# Patient Record
Sex: FEMALE | Race: WHITE | NOT HISPANIC OR LATINO | Employment: FULL TIME | URBAN - METROPOLITAN AREA
[De-identification: names, ages, dates, MRNs, and addresses within clinical notes are randomized per-mention and may not be internally consistent; named-entity substitution may affect disease eponyms.]

---

## 2023-07-21 ENCOUNTER — APPOINTMENT (OUTPATIENT)
Dept: CT IMAGING | Facility: HOSPITAL | Age: 53
End: 2023-07-21
Payer: COMMERCIAL

## 2023-07-21 ENCOUNTER — HOSPITAL ENCOUNTER (EMERGENCY)
Facility: HOSPITAL | Age: 53
Discharge: HOME | End: 2023-07-21
Attending: EMERGENCY MEDICINE | Admitting: EMERGENCY MEDICINE
Payer: COMMERCIAL

## 2023-07-21 VITALS
WEIGHT: 250.44 LBS | HEIGHT: 64 IN | BODY MASS INDEX: 42.76 KG/M2 | OXYGEN SATURATION: 94 % | HEART RATE: 73 BPM | DIASTOLIC BLOOD PRESSURE: 86 MMHG | SYSTOLIC BLOOD PRESSURE: 163 MMHG | TEMPERATURE: 98.2 F | RESPIRATION RATE: 16 BRPM

## 2023-07-21 DIAGNOSIS — G43.B0 OPHTHALMOPLEGIC MIGRAINE, NOT INTRACTABLE: Primary | ICD-10-CM

## 2023-07-21 LAB
HOLD SPECIMEN: NORMAL
WHOLE BLOOD HOLD SPECIMEN: NORMAL

## 2023-07-21 PROCEDURE — 70450 CT HEAD/BRAIN W/O DYE: CPT

## 2023-07-21 PROCEDURE — 96375 TX/PRO/DX INJ NEW DRUG ADDON: CPT

## 2023-07-21 PROCEDURE — 99283 EMERGENCY DEPT VISIT LOW MDM: CPT

## 2023-07-21 PROCEDURE — 25010000002 PROCHLORPERAZINE 10 MG/2ML SOLUTION: Performed by: EMERGENCY MEDICINE

## 2023-07-21 PROCEDURE — 96374 THER/PROPH/DIAG INJ IV PUSH: CPT

## 2023-07-21 PROCEDURE — 25010000002 DIPHENHYDRAMINE PER 50 MG: Performed by: EMERGENCY MEDICINE

## 2023-07-21 RX ORDER — DIPHENHYDRAMINE HYDROCHLORIDE 50 MG/ML
25 INJECTION INTRAMUSCULAR; INTRAVENOUS ONCE
Status: COMPLETED | OUTPATIENT
Start: 2023-07-21 | End: 2023-07-21

## 2023-07-21 RX ORDER — PROCHLORPERAZINE MALEATE 10 MG
10 TABLET ORAL EVERY 6 HOURS PRN
Qty: 12 TABLET | Refills: 0 | Status: SHIPPED | OUTPATIENT
Start: 2023-07-21

## 2023-07-21 RX ORDER — PROCHLORPERAZINE EDISYLATE 5 MG/ML
5 INJECTION INTRAMUSCULAR; INTRAVENOUS ONCE
Status: COMPLETED | OUTPATIENT
Start: 2023-07-21 | End: 2023-07-21

## 2023-07-21 RX ADMIN — DIPHENHYDRAMINE HYDROCHLORIDE 25 MG: 50 INJECTION, SOLUTION INTRAMUSCULAR; INTRAVENOUS at 18:35

## 2023-07-21 RX ADMIN — PROCHLORPERAZINE EDISYLATE 5 MG: 5 INJECTION INTRAMUSCULAR; INTRAVENOUS at 18:35

## 2023-07-21 NOTE — ED NOTES
Pt reports while at work earlier today around noon she had kaleidescope and blurry vision to her rt eye, this has resolved since. Pt reports now with HA and blurry vision to both eyes and pressure behind both eyes.

## 2023-07-21 NOTE — ED PROVIDER NOTES
Subjective   History of Present Illness  53-year-old female complaining of visual changes in her right.  She states she had flashing scotomata and sort of a kaleidoscope appearance.  She reports that there is no visual field dropout or amaurosis fugax.  She reports that she has had a headache afterwards.  The patient states that she has had no recent head injuries she denies neck pain or stiffness reports no fever or chills reports no recent change in visual acuity and has no history of elevated intraocular pressures.  She reports that she has had no syncope.  She states her last seizure was in 2014    Review of Systems   Constitutional:  Positive for fatigue. Negative for chills and fever.   Eyes:  Positive for visual disturbance. Negative for pain.   Respiratory:  Negative for chest tightness and shortness of breath.    Cardiovascular:  Negative for palpitations.   Neurological:  Positive for seizures and headaches. Negative for tremors, syncope, weakness and light-headedness.     Past Medical History:   Diagnosis Date    Abnormal mammogram of right breast     Allergic rhinitis     Anxiety     Back pain     LUMBAR AND THORACIC REGION    Chest pain     Dermatitis     Dyslipidemia     Elevated blood pressure reading     Fibrocystic breast disease     Foot pain, left     Glucose intolerance     Headache, tension-type     High risk heterosexual behavior     Hypertension     Obesity     Pain in joint, shoulder region     Perimenopausal     Right tennis elbow     Seizure disorder     Tinnitus     Vertigo      Patient states she is perimenopausal  No Known Allergies    Past Surgical History:   Procedure Laterality Date    COLONOSCOPY W/ BIOPSIES  01/15/2021    1 polyp removed, path tubular adenoma - repeat will be 7 (adenomatous) years    LAPAROSCOPIC OVARIAN CYSTECTOMY  12/17/2008    L PARA OVARIAN CYSTECTOMY        Family History   Problem Relation Age of Onset    Kidney disease Mother     Hypertension Mother     Heart  disease Father     Hypertension Father     Heart disease Brother     Kidney disease Brother     Heart disease Paternal Grandfather     Other Other         FAMILY HISTORY OF RENAL STONES- STRONGLY POSITIVE        Social History     Socioeconomic History    Marital status:    Tobacco Use    Smoking status: Never    Smokeless tobacco: Never   Vaping Use    Vaping Use: Never used   Substance and Sexual Activity    Alcohol use: Not Currently    Drug use: Never    Sexual activity: Defer       Reports no unusual food water travel or activity    Objective   Physical Exam  Alert Partha Coma Scale 15   HEENT: Pupils equal and reactive to light. Conjunctivae are not injected. Normal tympanic membranes. Oropharynx and nares are normal.  Normal IOP, no papilledema   Neck: Supple. Midline trachea. No JVD. No goiter.   Chest: Clear and equal breath sounds bilaterally, regular rate and rhythm without murmur or rub.   Abdomen: Positive bowel sounds, nontender, nondistended. No rebound or peritoneal signs. No CVA tenderness.   Extremities/neuro patient is right-handed.  The patient's cranial nerves are intact and symmetrical visual fields are intact to confrontational testing no clubbing. cyanosis or edema. Motor sensory exam is normal. The full range of motion is intact no abnormal reflexes elicited normal gait   Skin: Warm and dry, no rashes or petechia.   Lymphatic: No regional lymphadenopathy. No calf pain, swelling or Homans sign    Procedures           ED Course           Labs Reviewed   RAINBOW DRAW    Narrative:     The following orders were created for panel order [place] Draw.  Procedure                               Abnormality         Status                     ---------                               -----------         ------                     Green Top (Gel)[[id number]]                                  Final result               Lavender Top[[id number]]                                     Final result                  Please view results for these tests on the individual orders.   GREEN TOP   LAVENDER TOP     Medications   diphenhydrAMINE (BENADRYL) injection 25 mg (25 mg Intravenous Given 7/21/23 1835)   prochlorperazine (COMPAZINE) injection 5 mg (5 mg Intravenous Given 7/21/23 1835)     CT Head Without Contrast    Result Date: 7/21/2023  Impression: 1. No acute intracranial abnormality. Specifically, no evidence of acute hemorrhage, mass effect or midline shift. 2. Symmetric appearance of the orbits. Electronically Signed: Prasad Newman  7/21/2023 7:34 PM EDT  Workstation ID: [id number]                                   Medical Decision Making  The patient's symptomatology resolved after administration of Benadryl and prochlorperazine.  The patient be discharged a prescription for oral prochlorperazine.  Triptans were avoided secondary to concerns regarding potential blood pressure elevation.  The patient was encouraged to follow-up with neurologist.  The patient was stable at discharge and vocalized understanding of discharge instructions and warning    Amount and/or Complexity of Data Reviewed  Radiology: ordered.    Risk  Prescription drug management.        Final diagnoses:   Ophthalmoplegic migraine, not intractable       ED Disposition  ED Disposition       ED Disposition   Discharge    Condition   Stable    Comment   --               No follow-up provider specified.       Medication List      No changes were made to your prescriptions during this visit.            Ricardo Tuttle MD  07/21/23 2027

## 2023-07-22 NOTE — DISCHARGE INSTRUCTIONS
Rest next 24 hours, avoid bright sunlight and heat exposure  You can also use Tylenol or ibuprofen for discomfort  Medication as directed  Follow-up with primary care provider and neurologist

## 2023-10-18 ENCOUNTER — TELEPHONE (OUTPATIENT)
Dept: FAMILY MEDICINE CLINIC | Facility: CLINIC | Age: 53
End: 2023-10-18

## 2023-10-18 ENCOUNTER — OFFICE VISIT (OUTPATIENT)
Dept: FAMILY MEDICINE CLINIC | Facility: CLINIC | Age: 53
End: 2023-10-18
Payer: COMMERCIAL

## 2023-10-18 VITALS
SYSTOLIC BLOOD PRESSURE: 144 MMHG | BODY MASS INDEX: 43.71 KG/M2 | TEMPERATURE: 98.2 F | HEIGHT: 64 IN | DIASTOLIC BLOOD PRESSURE: 85 MMHG | OXYGEN SATURATION: 95 % | HEART RATE: 79 BPM | WEIGHT: 256 LBS

## 2023-10-18 DIAGNOSIS — R63.5 WEIGHT GAIN: ICD-10-CM

## 2023-10-18 DIAGNOSIS — G40.909 SEIZURE DISORDER: Primary | ICD-10-CM

## 2023-10-18 DIAGNOSIS — H53.461: ICD-10-CM

## 2023-10-18 PROCEDURE — 99214 OFFICE O/P EST MOD 30 MIN: CPT | Performed by: FAMILY MEDICINE

## 2023-10-18 RX ORDER — LEVETIRACETAM 500 MG/1
500 TABLET ORAL DAILY
Qty: 90 TABLET | Refills: 3 | Status: SHIPPED | OUTPATIENT
Start: 2023-10-18

## 2023-10-18 NOTE — PROGRESS NOTES
Subjective   Tamiko Mcnally is a 53 y.o. female.   Chief Complaint   Patient presents with    Cough    Hypothyroidism       History of Present Illness   Presents to the office today for annual follow-up.  I saw her last about a year ago.    She is overall in good health.  A little concerned today as she has gained around 50 pounds over the last year.    She has a seizure disorder and she followed with neurology regarding this for a long time.  She has not had any seizures for over 12 years.  Prescription for her Keppra was turned over to primary care years ago.  I am filling that for her now.  She has been on 1 pill a day for several years.  When she was on 2 a day it did cause a little decrease in mental clarity and some confusion.  She feels fine on 1/day.    Had labs thru GYN -recently.  I do not have those results.  Tells me her triglycerides were up.        HLD-Labs through GYN as above.    Preventive care:    Last colonoscopy was January 2021-she had a polyp removed and was recommended 7-year repeat.  Pap smears and mammograms are done by Dr. Miller in her office.  She just had that done last month.          Patient Active Problem List    Diagnosis Date Noted    Congestion of nasal sinus 12/21/2021    Adenomatous polyp of ascending colon 06/21/2021     Note Last Updated: 6/21/2021     On colo - 1/15/21      Anxiety 08/26/2016    Fibrocystic breast disease 09/14/2012     Note Last Updated: 10/10/2022     Benign - seen on mammograms.  Started in her 20's      Dyslipidemia 06/14/2012    Glucose intolerance 06/14/2012    Seizure disorder 06/14/2012     Note Last Updated: 2/21/2020     Last seizure 2011- typically abseance - had a full seizure once.  Dr. Whitehead recommended lifetime treatment.              Past Surgical History:   Procedure Laterality Date    COLONOSCOPY W/ BIOPSIES  01/15/2021    1 polyp removed, path tubular adenoma - repeat will be 7 (adenomatous) years    LAPAROSCOPIC OVARIAN CYSTECTOMY   "12/17/2008    L PARA OVARIAN CYSTECTOMY      Current Outpatient Medications on File Prior to Visit   Medication Sig    [DISCONTINUED] levETIRAcetam (KEPPRA) 500 MG tablet Take 1 tablet by mouth Daily.    [DISCONTINUED] metroNIDAZOLE (Metrogel) 1 % gel Apply  topically to the appropriate area as directed Daily. (Patient not taking: Reported on 10/18/2023)    [DISCONTINUED] prochlorperazine (COMPAZINE) 10 MG tablet Take 1 tablet by mouth Every 6 (Six) Hours As Needed for Vomiting or Nausea. (Patient not taking: Reported on 10/18/2023)     No current facility-administered medications on file prior to visit.     No Known Allergies  Social History     Socioeconomic History    Marital status:    Tobacco Use    Smoking status: Never    Smokeless tobacco: Never   Vaping Use    Vaping Use: Never used   Substance and Sexual Activity    Alcohol use: Not Currently    Drug use: Never    Sexual activity: Defer     Family History   Problem Relation Age of Onset    Kidney disease Mother     Hypertension Mother     Heart disease Father     Hypertension Father     Heart disease Brother     Kidney disease Brother     Heart disease Paternal Grandfather     Other Other         FAMILY HISTORY OF RENAL STONES- STRONGLY POSITIVE        Review of Systems    Objective   /85 (BP Location: Right arm, Patient Position: Sitting, Cuff Size: Adult)   Pulse 79   Temp 98.2 °F (36.8 °C) (Infrared)   Ht 162.6 cm (64.02\")   Wt 116 kg (256 lb)   SpO2 95%   BMI 43.92 kg/m²   Physical Exam  Constitutional:       General: She is not in acute distress.     Appearance: Normal appearance. She is well-developed.      Comments: Wearing a face mask     HENT:      Head: Normocephalic and atraumatic.   Eyes:      Conjunctiva/sclera: Conjunctivae normal.   Neck:      Vascular: No carotid bruit.   Cardiovascular:      Rate and Rhythm: Normal rate and regular rhythm.      Heart sounds: Normal heart sounds. No murmur heard.  Pulmonary:      Effort: " Pulmonary effort is normal. No respiratory distress.      Breath sounds: Normal breath sounds.   Musculoskeletal:         General: Normal range of motion.      Cervical back: Normal range of motion.      Right lower leg: No edema.      Left lower leg: No edema.   Skin:     General: Skin is warm and dry.      Findings: No rash.   Neurological:      Mental Status: She is alert and oriented to person, place, and time.      Gait: Gait normal.   Psychiatric:         Mood and Affect: Mood normal.         Behavior: Behavior normal.         Assessment & Plan   Diagnoses and all orders for this visit:    1. Seizure disorder (Primary)  -     levETIRAcetam (KEPPRA) 500 MG tablet; Take 1 tablet by mouth Daily.  Dispense: 90 tablet; Refill: 3    2. Transient homonymous hemianopsia, right  -     US Carotid Bilateral; Future    3. Weight gain    Overall, I spent 30 minutes on the day of service both in the room with patient, reviewing her chart, tracking down records afterward, documenting.  Seizure disorder is a chronic problem which currently is asymptomatic.  Refill Keppra today at a dose of 500 mg/day.  Blood pressure is normal.  As above, she had lab work at her gynecologist.  We will try to get that and I will take a look at those.  We did talk about her weight.  She is going to try keto diet, try exercising more.  She would be an excellent candidate for Wegovy.  She is going to contact her insurance company about this and let me know if they will cover it.  As above, she had vision transient loss in her right eye back in July.  ER doctor thought it was a hemiplegic migraine.  On auscultation today, she does not have bruits, but I think it is appropriate to do a carotid ultrasound.  [facility]  I will follow-up with her when the ultrasound is back and after I had a chance to look at the labs from her gynecologist.      Call with any problems or concerns before next visit       Return in  about 1 year (around 10/18/2024), or or sooner if needed.      Much of this report is an electronic transcription of spoken language to printed text using Dragon dictation software.  As such, the subtleties and finesse of spoken language may permit erroneous, or at times, nonsensical words or phrases to be inadvertently transcribed; thus changes may be made at a later date to rectify these errors.     Sarah Grullon MD10/18/981892:09 EDT  This note has been electronically signed

## 2023-10-18 NOTE — TELEPHONE ENCOUNTER
Hub staff attempted to follow warm transfer process and was unsuccessful     Caller: Tamiko Mcnally    Relationship to patient: Self    Best call back number: [phone]    Patient is needing:   RETURNING PHONE CALL TO JUNIOR

## 2023-10-20 ENCOUNTER — TELEPHONE (OUTPATIENT)
Dept: FAMILY MEDICINE CLINIC | Facility: CLINIC | Age: 53
End: 2023-10-20
Payer: COMMERCIAL

## 2023-10-20 NOTE — TELEPHONE ENCOUNTER
Referral has been faxed, tried to contact pt to let her know, no answer, left detailed message on machine in regards

## 2023-10-20 NOTE — TELEPHONE ENCOUNTER
Caller: Tamiko Mcnally    Relationship: Self    Best call back number: [phone]    What orders are you requesting (i.e. lab or imaging): US CARTOID BILATERAL     In what timeframe would the patient need to come in: ASAP    Where will you receive your lab/imaging services: [facility]    Additional notes: PATIENT CALLED AND SAID THAT SHE WOULD LIKE THIS ORDER SENT TO [facility]

## 2023-10-24 ENCOUNTER — TELEPHONE (OUTPATIENT)
Dept: FAMILY MEDICINE CLINIC | Facility: CLINIC | Age: 53
End: 2023-10-24
Payer: COMMERCIAL

## 2023-10-24 DIAGNOSIS — R63.5 WEIGHT GAIN: Primary | ICD-10-CM

## 2023-10-24 DIAGNOSIS — E78.5 DYSLIPIDEMIA: ICD-10-CM

## 2023-10-24 RX ORDER — SEMAGLUTIDE 0.25 MG/.5ML
0.25 INJECTION, SOLUTION SUBCUTANEOUS WEEKLY
Qty: 2 ML | Refills: 0 | Status: SHIPPED | OUTPATIENT
Start: 2023-10-24

## 2023-10-24 NOTE — TELEPHONE ENCOUNTER
Caller: Tamiko Mcnally    Relationship: Self    Best call back number: [phone]    What form or medical record are you requesting: PRIOR AUTHORIZATION    Who is requesting this form or medical record from you: INSURANCE    How would you like to receive the form or medical records PHONE: [phone]        Additional notes: FOR APPROVAL OF Wegovy      SHE WOULD ALSO LIKE A CALL TO DISCUSS HER POTENTIAL FOR SIDE EFFECTS, ESPECIALLY PANCREATITIS

## 2023-10-24 NOTE — TELEPHONE ENCOUNTER
HUB TO RELAY     LEFT MESSAGE TO RETURN CALL     DR ARGUELLO HAS SENT IN THE EpuramatY BUT IT IS ON BACK ORDER AT [facility]. YOU CAN CALL AROUND AND SEE IF ANYONE HAS IT IN STOCK AND WE CAN SEND IT TO THAT PHARMACY. ONCE IT IS AVAILABLE AND IF IT NEEDS A PRIOR AUTH THAN WE WILL TRY TO GET IT APPROVED

## 2023-10-24 NOTE — TELEPHONE ENCOUNTER
At our office visit-we left it that she was going to check her insurance for coverage.  Apparently they told her it needed a prior authorization.  I sent the prescription in.  We will see what we get back from her insurance and go from there.  Thanks!

## 2023-10-25 NOTE — TELEPHONE ENCOUNTER
Caller: Tamiko Mcnally    Relationship: Self    Best call back number: [phone]       PATIENT HAS BEEN GIVEN THIS RELAY MESSAGE.    SHE WONDERED IF WE STARTED THE PRIOR AUTHORIZATION PROCESS ALREADY, OR DOES SHE HAVE TO FIRST FIND A PHARMACY WITH THE WEGOVY IN STOCK ?        PLEASE ADVISE

## 2023-11-06 DIAGNOSIS — H53.461: ICD-10-CM

## 2024-01-25 ENCOUNTER — OFFICE VISIT (OUTPATIENT)
Dept: SPORTS MEDICINE | Facility: CLINIC | Age: 54
End: 2024-01-25
Payer: COMMERCIAL

## 2024-01-25 VITALS — OXYGEN SATURATION: 96 % | HEIGHT: 64 IN | WEIGHT: 256 LBS | BODY MASS INDEX: 43.71 KG/M2

## 2024-01-25 DIAGNOSIS — S93.421A SPRAIN OF DELTOID LIGAMENT OF RIGHT ANKLE, INITIAL ENCOUNTER: ICD-10-CM

## 2024-01-25 DIAGNOSIS — S82.51XA CLOSED AVULSION FRACTURE OF MEDIAL MALLEOLUS OF RIGHT TIBIA, INITIAL ENCOUNTER: ICD-10-CM

## 2024-01-25 DIAGNOSIS — M25.571 ACUTE RIGHT ANKLE PAIN: Primary | ICD-10-CM

## 2024-01-25 NOTE — PROGRESS NOTES
NEW VISIT    Patient: Tamiko Mcnally  ?  YOB: 1970    MRN: [id number]  ?  Chief Complaint   Patient presents with    Right Ankle - Initial Evaluation      ?  HPI: Patient is a 53-year-old female presents today for acute right ankle pain.  States on 1/8/2024 she suffered an ankle sprain while coming down the steps.  Thinks was eversion mechanism, but has had pain primarily over the medial aspect of the ankle since that time.  Evaluated at urgent care with x-rays obtained as noted below and subsequently placed in short cam walking boot which she has been wearing with all weightbearing activity since her urgent care visit.  She states her symptoms are approximately 80% better from initial injury.  Denies any pain with weightbearing and walking boot.  Has not tried weightbearing outside of walking boot at this time.  He is to have mild discomfort at the tip of the medial malleolus, as well as slightly over anterior medial joint space.  Has not utilize any oral medications, or ice regularly.  No ankle range of motion exercises at this time      Allergies: No Known Allergies    Past Medical History:   Diagnosis Date    Abnormal mammogram of right breast     Allergic rhinitis     Anxiety     Back pain     LUMBAR AND THORACIC REGION    Chest pain     Dermatitis     Dyslipidemia     Elevated blood pressure reading     Fibrocystic breast disease     Foot pain, left     Glucose intolerance     Headache, tension-type     High risk heterosexual behavior     Hypertension     Obesity     Pain in joint, shoulder region     Perimenopausal     Right tennis elbow     Seizure disorder     Tinnitus     Vertigo      Past Surgical History:   Procedure Laterality Date    COLONOSCOPY W/ BIOPSIES  01/15/2021    1 polyp removed, path tubular adenoma - repeat will be 7 (adenomatous) years    LAPAROSCOPIC OVARIAN CYSTECTOMY  12/17/2008    L PARA OVARIAN CYSTECTOMY      Social History     Occupational History    Not on file  "  Tobacco Use    Smoking status: Never     Passive exposure: Never    Smokeless tobacco: Never   Vaping Use    Vaping Use: Never used   Substance and Sexual Activity    Alcohol use: Not Currently    Drug use: Never    Sexual activity: Defer      Social History     Social History Narrative    Not on file     Family History   Problem Relation Age of Onset    Kidney disease Mother     Hypertension Mother     Heart disease Father     Hypertension Father     Heart disease Brother     Kidney disease Brother     Heart disease Paternal Grandfather     Other Other         FAMILY HISTORY OF RENAL STONES- STRONGLY POSITIVE        Review of Systems  Constitutional: Negative.  Negative for fever.   Musculoskeletal: Positive for joint pain  Skin: Negative.  Negative for rash and wound.    Neurological: Negative for numbness.     Vitals:    01/25/24 1054   SpO2: 96%   Weight: 116 kg (256 lb)   Height: 162.6 cm (64\")        Physical Exam  Constitutional: Patient is oriented to person, place, and time. Appears well-developed and well-nourished.   Head: Normocephalic and atraumatic.   Pulmonary/Chest: Effort normal.   Musculoskeletal:   See detailed exam below   Neurological: Alert and oriented to person, place, and time. No sensory deficit. Coordination normal.   Skin: Skin is warm and dry. Capillary refill takes less than 2 seconds. No rash noted. No erythema.     Ortho Exam:     Patient presenting in short cam walking boot of right ankle.  The right ankle is without obvious signs of acute bony deformity, swelling, erythema or ecchymosis.  There is mild tenderness at the tip of the medial malleolus, as well as deltoid ligament at the site.  No tenderness laterally over lateral ankle complex.  Mild tenderness of the anterior medial joint line.  No tenderness of the lateral joint line. There is no bony crepitus or step-off. There is no midfoot or forefoot tenderness. Active range of motion is limited, pain-free and symmetrical. " Passive range of motion is omitted, pain-free and symmetrical. Instability test are negative with anterior drawer, talar tilt and eversion stress tests. Tibia-fibula squeeze, calcaneal squeeze, and forefoot squeeze tests are negative. Blanco's test and Homans sign are negative. Strength is 4+/5.  The opposite ankle is otherwise normal and stable. Gait is pain-free in walking boot and tandem.    Diagnostics:  xrays obtained last on 1/11/2024     XR Ankle 3+ View Right    Result Date: 1/11/2024  Impression: Some osseous irregularity at the tip of the medial malleolus for which fracture cannot be entirely excluded. Correlate on physical exam the patient is tender at this site. Electronically Signed: Eleni Doran MD  1/11/2024 3:12 PM CST  Workstation ID: [id number]         Assessment:  Diagnoses and all orders for this visit:    1. Acute right ankle pain (Primary)    2. Closed avulsion fracture of medial malleolus of right tibia, initial encounter    3. Sprain of deltoid ligament of right ankle, initial encounter      ?    Plan    Above diagnosis and plan discussed with patient in office today.  I did personally review her prior x-rays from urgent care with noted small irregularity at the medial malleolus near the deltoid insertion, which correlates with patient's area of tenderness on exam today.  No noted ankle instability either clinically or radiographically.  Patient has clinically improved significantly with short walking boot immobilization and is currently 2-1/2 weeks from initial injury.  Main concern is return to activity, and potential arthritic change in the future.  Did discuss potential risk for stiffness and mild arthritic change over the medial joint space.  Given continued tenderness over radiographic finding, and likely deltoid ligament sprain, discussed typical 4 to 6-week healing both ligamentous and osseous injury.  Will keep in short walking boot with all weightbearing activities outside of  her home.  Will allow her weightbearing outside of walking boot for short distances in her house, to promote ankle range of motion, and avoid significant stiffness from immobilization.  Was also given ankle rehabilitation exercises and encouraged to do daily range of motion exercises outside of the walking boot.  Avoid strengthening for size at this time.  We will gradual 2-week follow-up to monitor progress with partial mobilization and conservative management.   Use of  short walking boot  discussed and recommended with any weightbearing activities outside of her home.  Rest, ice, compression, and elevation (RICE) therapy  Encouraged daily ankle range of motion exercises  OTC Alternate Ibuprofen and Tylenol as needed  Follow up in 2 week(s)    Date of encounter: 01/25/2024   Андрей Syed DO    Electronically signed by Андрей Syed DO, 01/25/24, 10:55 AM EST.    Disclaimer: Please note that areas of this note were completed with computer voice recognition software.  Quite often unanticipated grammatical, syntax, homophones, and other interpretive errors are inadvertently transcribed by the computer software. Please excuse any errors that have escaped final proofreading.

## 2024-01-26 ENCOUNTER — PATIENT ROUNDING (BHMG ONLY) (OUTPATIENT)
Dept: ORTHOPEDIC SURGERY | Facility: CLINIC | Age: 54
End: 2024-01-26
Payer: COMMERCIAL

## 2024-01-29 ENCOUNTER — TELEPHONE (OUTPATIENT)
Dept: FAMILY MEDICINE CLINIC | Facility: CLINIC | Age: 54
End: 2024-01-29
Payer: COMMERCIAL

## 2024-01-29 DIAGNOSIS — L71.9 ROSACEA: Primary | ICD-10-CM

## 2024-01-29 RX ORDER — METRONIDAZOLE 10 MG/G
GEL TOPICAL DAILY
Qty: 60 G | Refills: 2 | Status: SHIPPED | OUTPATIENT
Start: 2024-01-29

## 2024-01-29 NOTE — TELEPHONE ENCOUNTER
You last Rx'd on 11/10/21.  She uses it for rosacea.  The directions state uses daily to affected area.      Thank you

## 2024-01-29 NOTE — TELEPHONE ENCOUNTER
Pt has previously been on Metrogel to apply to face for rash.  She is requesting a refill.    Pt uses [facility]

## 2024-02-08 ENCOUNTER — OFFICE VISIT (OUTPATIENT)
Dept: SPORTS MEDICINE | Facility: CLINIC | Age: 54
End: 2024-02-08
Payer: COMMERCIAL

## 2024-02-08 VITALS — HEART RATE: 69 BPM | WEIGHT: 256 LBS | HEIGHT: 64 IN | OXYGEN SATURATION: 98 % | BODY MASS INDEX: 43.71 KG/M2

## 2024-02-08 DIAGNOSIS — S82.51XD CLOSED AVULSION FRACTURE OF MEDIAL MALLEOLUS OF RIGHT TIBIA WITH ROUTINE HEALING, SUBSEQUENT ENCOUNTER: ICD-10-CM

## 2024-02-08 DIAGNOSIS — M25.571 ACUTE RIGHT ANKLE PAIN: ICD-10-CM

## 2024-02-08 DIAGNOSIS — S93.421D SPRAIN OF DELTOID LIGAMENT OF RIGHT ANKLE, SUBSEQUENT ENCOUNTER: Primary | ICD-10-CM

## 2024-02-08 NOTE — PROGRESS NOTES
FOLLOW UP VISIT    Patient: Tamiko Mcnally  ?  YOB: 1970    MRN: [id number]  ?  Chief Complaint   Patient presents with    Right Ankle - Follow-up      ?  HPI: Patient returns today for follow-up of right ankle pain.  She has been in boot immobilization with weightbearing activity except for her around her house for the last 2 weeks since her last office visit.  She states over the last 4 days she has weaned out of the boot, and has been ambulating in supportive shoe wear.  She states pain continues to improve.  She has mild anterior medial ankle pain, with prolonged walking activity or standing, but otherwise minimal to no symptoms.  She denies any locking, catching, clicking/popping or swelling.  Denies numbness or tingling.  Is no longer walking with a limp.  No regular oral medications.      Allergies: No Known Allergies    Past Medical History:   Diagnosis Date    Abnormal mammogram of right breast     Allergic rhinitis     Anxiety     Back pain     LUMBAR AND THORACIC REGION    Chest pain     Dermatitis     Dyslipidemia     Elevated blood pressure reading     Fibrocystic breast disease     Foot pain, left     Glucose intolerance     Headache, tension-type     High risk heterosexual behavior     Hypertension     Obesity     Pain in joint, shoulder region     Perimenopausal     Right tennis elbow     Seizure disorder     Tinnitus     Vertigo      Past Surgical History:   Procedure Laterality Date    COLONOSCOPY W/ BIOPSIES  01/15/2021    1 polyp removed, path tubular adenoma - repeat will be 7 (adenomatous) years    LAPAROSCOPIC OVARIAN CYSTECTOMY  12/17/2008    L PARA OVARIAN CYSTECTOMY      Social History     Occupational History    Not on file   Tobacco Use    Smoking status: Never     Passive exposure: Never    Smokeless tobacco: Never   Vaping Use    Vaping Use: Never used   Substance and Sexual Activity    Alcohol use: Not Currently    Drug use: Never    Sexual activity: Defer      Social  "History     Social History Narrative    Not on file     Family History   Problem Relation Age of Onset    Kidney disease Mother     Hypertension Mother     Heart disease Father     Hypertension Father     Heart disease Brother     Kidney disease Brother     Heart disease Paternal Grandfather     Other Other         FAMILY HISTORY OF RENAL STONES- STRONGLY POSITIVE        Review of Systems  Constitutional: Negative.  Negative for fever.   Musculoskeletal: Positive for joint pain  Skin: Negative.  Negative for rash and wound.    Neurological: Negative for numbness.     Vitals:    02/08/24 1040   Pulse: 69   SpO2: 98%   Weight: 116 kg (256 lb)   Height: 162.6 cm (64\")        Physical Exam  Constitutional: Patient is oriented to person, place, and time. Appears well-developed and well-nourished.   Head: Normocephalic and atraumatic.   Pulmonary/Chest: Effort normal.   Musculoskeletal:   See detailed exam below   Neurological: Alert and oriented to person, place, and time. No sensory deficit. Coordination normal.   Skin: Skin is warm and dry. Capillary refill takes less than 2 seconds. No rash noted. No erythema.     Ortho Exam:     Patient presenting in short cam walking boot of right ankle.  The right ankle is without obvious signs of acute bony deformity, swelling, erythema or ecchymosis.  There is mild tenderness at the tip of the medial malleolus, as well as deltoid ligament at the site.  No tenderness laterally over lateral ankle complex.  Mild tenderness of the anterior medial joint line.  No tenderness of the lateral joint line. There is no bony crepitus or step-off. There is no midfoot or forefoot tenderness. Active range of motion is limited, pain-free and symmetrical. Passive range of motion is omitted, pain-free and symmetrical. Instability test are negative with anterior drawer, talar tilt and eversion stress tests. Tibia-fibula squeeze, calcaneal squeeze, and forefoot squeeze tests are negative. Blanco's " test and Homans sign are negative. Strength is 4+/5.  The opposite ankle is otherwise normal and stable. Gait is pain-free in walking boot and tandem.    Diagnostics:  no diagnostic testing performed this visit     XR Ankle 3+ View Right    Result Date: 1/11/2024  Impression: Some osseous irregularity at the tip of the medial malleolus for which fracture cannot be entirely excluded. Correlate on physical exam the patient is tender at this site. Electronically Signed: Eleni Doran MD  1/11/2024 3:12 PM CST  Workstation ID: [id number]         Assessment:  Diagnoses and all orders for this visit:    1. Sprain of deltoid ligament of right ankle, subsequent encounter (Primary)    2. Closed avulsion fracture of medial malleolus of right tibia with routine healing, subsequent encounter    3. Acute right ankle pain        ?    Plan    Patient following up for acute right ankle sprain, deltoid sprain and nondisplaced medial malleolus fracture.  Currently 4-1/2 weeks from initial injury.  She continues to clinically improve, and at this time has weaned out of walking boot immobilization without increase in symptoms.  She continues to have mild anterior medial pain more localized to the anterior deltoid ligament fibers, then fracture site over lateral malleolus.  Minimal bony tenderness over lateral malleolus.  Given clinical improvement, will discontinue boot immobilization at this time, and initiate ankle strengthening rehabilitation.  She was given print out in office today for ankle strengthening exercises, as well as proprioception.  Discussed will have ongoing bony and ligament healing until at least 6-week josé miguel.  At this time patient prefers to follow-up as needed, if she has new or worsening symptoms.  Will discontinue boot immobilization at this time, and start ankle rehabilitation exercises.  Rest, ice, compression, and elevation (RICE) therapy  OTC Alternate Ibuprofen and Tylenol as needed  Follow up as needed if  new or worsening symptoms    Date of encounter: 2/8/2024  Андрей Syed DO    Disclaimer: Please note that areas of this note were completed with computer voice recognition software.  Quite often unanticipated grammatical, syntax, homophones, and other interpretive errors are inadvertently transcribed by the computer software. Please excuse any errors that have escaped final proofreading.

## 2024-11-15 RX ORDER — SEMAGLUTIDE 0.5 MG/.5ML
0.5 INJECTION, SOLUTION SUBCUTANEOUS
Qty: 2 ML | Refills: 0 | Status: SHIPPED | OUTPATIENT
Start: 2024-11-15

## 2024-12-20 ENCOUNTER — OFFICE VISIT (OUTPATIENT)
Dept: FAMILY MEDICINE CLINIC | Facility: CLINIC | Age: 54
End: 2024-12-20
Payer: COMMERCIAL

## 2024-12-20 VITALS
BODY MASS INDEX: 43.19 KG/M2 | HEART RATE: 72 BPM | HEIGHT: 64 IN | SYSTOLIC BLOOD PRESSURE: 124 MMHG | DIASTOLIC BLOOD PRESSURE: 83 MMHG | WEIGHT: 253 LBS | TEMPERATURE: 95.5 F | OXYGEN SATURATION: 95 %

## 2024-12-20 DIAGNOSIS — G40.909 SEIZURE DISORDER: ICD-10-CM

## 2024-12-20 DIAGNOSIS — L91.8 SKIN TAG: ICD-10-CM

## 2024-12-20 RX ORDER — IMIQUIMOD 12.5 MG/.25G
1 CREAM TOPICAL 3 TIMES WEEKLY
Qty: 12 EACH | Refills: 0 | Status: SHIPPED | OUTPATIENT
Start: 2024-12-20

## 2024-12-20 NOTE — PROGRESS NOTES
Subjective   Tamiko Mcnally is a 54 y.o. female.   Chief Complaint   Patient presents with    Weight Check       History of Present Illness   54 y.o. female presents to the office today for follow-up on medically assisted weight loss with Wegovy.  We titrated her up to 0.5 mg/week about a month ago.  Weight at visit in October was 257 pounds with a BMI of 44.  Weight today is    History of Present Illness  The patient presents for evaluation of weight management and skin tags.    She reports a significant decrease in her appetite, often needing to remind herself to eat to prevent acid reflux. She has experienced mild constipation, a change from her usual regular bowel movements. She has been on a regimen of 0.25 mg of Wegovy for 4 weeks, followed by 0.5 mg for an additional 2 weeks. She has 2 more doses of the 0.5 mg left. She has observed a weight loss of 6 pounds since starting the medication. She plans to resume her gym routine now that her schedule has stabilized. She expresses concern about potential pancreatitis, citing information she found online. She also reports experiencing pain during breathing. She describes a metallic taste in her mouth approximately 30 minutes after administration of the injection. She recalls an incident where she engaged in painting for 9 hours on a Saturday, with a 1-hour break, which resulted in severe shoulder pain the following day. The pain intensified on Tuesday, particularly when transitioning from sitting to standing positions, but has since shown improvement. She administers the injection in her abdomen, alternating sites, and reports no redness or itching at the injection site.    She has a skin tag on her left neck that has become inflamed and irritated due to friction from her hair and clothing. The area was previously erythematous but has since calmed down. She reports the presence of multiple skin tags, clustered together    MEDICATIONS  Current: Wegovy      Patient  Active Problem List    Diagnosis Date Noted    Rosacea 01/29/2024    Congestion of nasal sinus 12/21/2021    Adenomatous polyp of ascending colon 06/21/2021     Note Last Updated: 6/21/2021     On colo - 1/15/21      Anxiety 08/26/2016    Fibrocystic breast disease 09/14/2012     Note Last Updated: 10/10/2022     Benign - seen on mammograms.  Started in her 20's      Dyslipidemia 06/14/2012    Glucose intolerance 06/14/2012    Seizure disorder 06/14/2012     Note Last Updated: 2/21/2020     Last seizure 2011- typically abseance - had a full seizure once.  Dr. Whitehead recommended lifetime treatment.              Past Surgical History:   Procedure Laterality Date    COLONOSCOPY W/ BIOPSIES  01/15/2021    1 polyp removed, path tubular adenoma - repeat will be 7 (adenomatous) years    LAPAROSCOPIC OVARIAN CYSTECTOMY  12/17/2008    L PARA OVARIAN CYSTECTOMY      Current Outpatient Medications on File Prior to Visit   Medication Sig    levETIRAcetam (KEPPRA) 500 MG tablet Take 1 tablet by mouth Daily.    metroNIDAZOLE (Metrogel) 1 % gel Apply  topically to the appropriate area as directed Daily.    Semaglutide-Weight Management (Wegovy) 0.5 MG/0.5ML solution auto-injector Inject 0.5 mL under the skin into the appropriate area as directed Every 7 (Seven) Days.     No current facility-administered medications on file prior to visit.     No Known Allergies  Social History     Socioeconomic History    Marital status:    Tobacco Use    Smoking status: Never     Passive exposure: Never    Smokeless tobacco: Never   Vaping Use    Vaping status: Never Used   Substance and Sexual Activity    Alcohol use: Not Currently    Drug use: Never    Sexual activity: Defer     Family History   Problem Relation Age of Onset    Kidney disease Mother     Hypertension Mother     Heart disease Father     Hypertension Father     Heart disease Brother     Kidney disease Brother     Heart disease Paternal Grandfather     Other Other         " FAMILY HISTORY OF RENAL STONES- STRONGLY POSITIVE        Review of Systems    Objective   /83 (BP Location: Right arm, Patient Position: Sitting, Cuff Size: Adult)   Pulse 72   Temp 95.5 °F (35.3 °C) (Infrared)   Ht 162.6 cm (64.02\")   Wt 115 kg (253 lb)   LMP 04/01/2022 (Approximate)   SpO2 95%   BMI 43.41 kg/m²   Physical Exam  Constitutional:       Appearance: She is well-developed.      Comments:      HENT:      Head: Normocephalic and atraumatic.   Eyes:      Conjunctiva/sclera: Conjunctivae normal.   Neck:      Comments: 2 tiny, 1 mm in diameter skin tags on the left side of her posterior neck  Cardiovascular:      Rate and Rhythm: Normal rate.   Pulmonary:      Effort: Pulmonary effort is normal.   Musculoskeletal:         General: Normal range of motion.      Cervical back: Normal range of motion.   Skin:     General: Skin is warm and dry.      Findings: No rash.   Neurological:      Mental Status: She is alert and oriented to person, place, and time.   Psychiatric:         Mood and Affect: Mood normal.         Behavior: Behavior normal.       Physical Exam        Office Visit on 10/18/2024   Component Date Value Ref Range Status    Hemoglobin A1C 10/18/2024 6.3 (H)  4.8 - 5.6 % Final    Comment:          Prediabetes: 5.7 - 6.4           Diabetes: >6.4           Glycemic control for adults with diabetes: <7.0      Glucose 10/18/2024 108 (H)  70 - 99 mg/dL Final    BUN 10/18/2024 14  6 - 24 mg/dL Final    Creatinine 10/18/2024 0.84  0.57 - 1.00 mg/dL Final    EGFR Result 10/18/2024 84  >59 mL/min/1.73 Final    BUN/Creatinine Ratio 10/18/2024 17  9 - 23 Final    Sodium 10/18/2024 140  134 - 144 mmol/L Final    Potassium 10/18/2024 4.9  3.5 - 5.2 mmol/L Final    Chloride 10/18/2024 101  96 - 106 mmol/L Final    Total CO2 10/18/2024 26  20 - 29 mmol/L Final    Calcium 10/18/2024 9.7  8.7 - 10.2 mg/dL Final    Total Protein 10/18/2024 6.7  6.0 - 8.5 g/dL Final    Albumin 10/18/2024 4.4  3.8 - 4.9 " g/dL Final    Globulin 10/18/2024 2.3  1.5 - 4.5 g/dL Final    Total Bilirubin 10/18/2024 0.3  0.0 - 1.2 mg/dL Final    Alkaline Phosphatase 10/18/2024 99  44 - 121 IU/L Final    AST (SGOT) 10/18/2024 19  0 - 40 IU/L Final    ALT (SGPT) 10/18/2024 20  0 - 32 IU/L Final    WBC 10/18/2024 7.3  3.4 - 10.8 x10E3/uL Final    RBC 10/18/2024 4.91  3.77 - 5.28 x10E6/uL Final    Hemoglobin 10/18/2024 13.9  11.1 - 15.9 g/dL Final    Hematocrit 10/18/2024 42.9  34.0 - 46.6 % Final    MCV 10/18/2024 87  79 - 97 fL Final    MCH 10/18/2024 28.3  26.6 - 33.0 pg Final    MCHC 10/18/2024 32.4  31.5 - 35.7 g/dL Final    RDW 10/18/2024 14.0  11.7 - 15.4 % Final    Platelets 10/18/2024 324  150 - 450 x10E3/uL Final    Neutrophil Rel % 10/18/2024 52  Not Estab. % Final    Lymphocyte Rel % 10/18/2024 36  Not Estab. % Final    Monocyte Rel % 10/18/2024 8  Not Estab. % Final    Eosinophil Rel % 10/18/2024 2  Not Estab. % Final    Basophil Rel % 10/18/2024 1  Not Estab. % Final    Neutrophils Absolute 10/18/2024 3.9  1.4 - 7.0 x10E3/uL Final    Lymphocytes Absolute 10/18/2024 2.6  0.7 - 3.1 x10E3/uL Final    Monocytes Absolute 10/18/2024 0.6  0.1 - 0.9 x10E3/uL Final    Eosinophils Absolute 10/18/2024 0.1  0.0 - 0.4 x10E3/uL Final    Basophils Absolute 10/18/2024 0.1  0.0 - 0.2 x10E3/uL Final    Immature Granulocyte Rel % 10/18/2024 1  Not Estab. % Final    Immature Grans Absolute 10/18/2024 0.1  0.0 - 0.1 x10E3/uL Final    Total Cholesterol 10/18/2024 234 (H)  100 - 199 mg/dL Final    Triglycerides 10/18/2024 296 (H)  0 - 149 mg/dL Final    HDL Cholesterol 10/18/2024 37 (L)  >39 mg/dL Final    VLDL Cholesterol Mitch 10/18/2024 54 (H)  5 - 40 mg/dL Final    LDL Chol Calc (NIH) 10/18/2024 143 (H)  0 - 99 mg/dL Final     Results            Assessment & Plan   Diagnoses and all orders for this visit:    1. Body mass index (BMI) of 40.0 to 44.9 in adult (Primary)    2. Seizure disorder    3. Skin tag  -     imiquimod (Aldara) 5 % cream; Apply 1  Application topically to the appropriate area as directed 3 (Three) Times a Week.  Dispense: 12 each; Refill: 0      Assessment & Plan  1. Weight management.  Her symptoms do not align with a diagnosis of pancreatitis, which would typically present with severe epigastric pain, vomiting, anorexia, fevers, chills, and diaphoresis. The constipation she is experiencing is likely a side effect of the medication, which can slow gastric emptying and intestinal motility. She has been tolerating the medication well. She is advised to continue her current regimen of Wegovy 0.5 mg for the next 2 weeks, after which the dosage will be increased to 1 mg per week. She is encouraged to maintain regular exercise to preserve muscle tone and enhance caloric expenditure. If she experiences significant weight loss at the 1.7 mg dosage and wishes to maintain this level for an extended period, this can be accommodated. However, it is recommended that she increase the dosage to 2.5 mg as tolerated. A prescription for Wegovy 1 mg will be sent to [facility] with a notation that she will be ready for it in about 2 weeks-sometime in the first week of January.    2. Skin tags.  She is advised to apply Aldara cream to the skin tags every other night for a duration of 4 weeks. This treatment may result in redness and irritation, similar to a mild sunburn, but should ultimately lead to the detachment of the skin tags without scarring.    Seizure disorder is stable without recent seizure.  Continues to tolerate Keppra 500 mg daily well.    Follow-up  The patient is scheduled for a follow-up visit in 3 months.        Call with any problems or concerns before next visit       Return in about 3 months (around 3/20/2025).  Patient or patient representative verbalized consent for the use of Ambient Listening during the visit with  Sarah Grullon MD for chart documentation. 12/20/2024  16:36 EST    Part of this note may be an electronic  transcription/translation of spoken language to printed text using the Dragon Dictation System    Sarah Grullon MD12/20/202416:35 EST  This note has been electronically signed

## 2024-12-23 RX ORDER — SEMAGLUTIDE 0.5 MG/.5ML
0.5 INJECTION, SOLUTION SUBCUTANEOUS
Qty: 2 ML | Refills: 0 | Status: SHIPPED | OUTPATIENT
Start: 2024-12-23

## 2024-12-23 NOTE — TELEPHONE ENCOUNTER
Spoke with Tamiko and she is needing her Semaglutide-Weight Management (Wegovy) 0.5 MG/0.5ML solution auto-injector. Sent to [facility] instead of [facility].

## 2024-12-23 NOTE — TELEPHONE ENCOUNTER
Just to be clear, I did not think her insurance would cover Wegovy.  That is why I sent the order for the compounded version to [facility] pharmacy.  [facility] cannot do the compounded version.  I sent a prescription for Wegovy to [facility].  Hopefully they cover it.  Thanks!

## 2025-01-13 ENCOUNTER — TELEPHONE (OUTPATIENT)
Dept: FAMILY MEDICINE CLINIC | Facility: CLINIC | Age: 55
End: 2025-01-13
Payer: COMMERCIAL

## 2025-01-13 NOTE — TELEPHONE ENCOUNTER
Caller: Tamiko Mcnally    Relationship: Self    Best call back number: [phone]      What is the best time to reach you: CALL AFTER 3:30 OKAY TO LEAVE VOICEMAIL     Who are you requesting to speak with (clinical staff, provider,  specific staff member): CLINICAL      What was the call regarding: PATIENT GOT A LETTER IN THE MAIL THAT INSURANCE IS NO LONGER GOING TO COVER WEGOVY. SHE IS REQUESTING A CALLBACK TO DISCUSS FURTHER MEDICATION  OPTIONS AND THEIR SIDE EFFECTS.     PLEASE CALL TO DISCUSS     Is it okay if the provider responds through MyChart: NO